# Patient Record
Sex: FEMALE | Race: WHITE | NOT HISPANIC OR LATINO | Employment: STUDENT
[De-identification: names, ages, dates, MRNs, and addresses within clinical notes are randomized per-mention and may not be internally consistent; named-entity substitution may affect disease eponyms.]

---

## 2019-05-17 ENCOUNTER — NEW PATIENT (OUTPATIENT)
Age: 24
End: 2019-05-17

## 2019-05-17 DIAGNOSIS — H52.203: ICD-10-CM

## 2019-05-17 DIAGNOSIS — H31.091: ICD-10-CM

## 2019-05-17 DIAGNOSIS — H44.23: ICD-10-CM

## 2019-05-17 PROCEDURE — 92004 COMPRE OPH EXAM NEW PT 1/>: CPT

## 2019-05-17 PROCEDURE — 92015 DETERMINE REFRACTIVE STATE: CPT

## 2019-05-17 ASSESSMENT — VISUAL ACUITY
OS_CC: 20/20-1
OD_CC: 20/20

## 2019-05-17 ASSESSMENT — TONOMETRY
OD_IOP_MMHG: 18
OS_IOP_MMHG: 16

## 2020-09-24 ENCOUNTER — RX CHANGE - NO APPT (OUTPATIENT)
Age: 25
End: 2020-09-24

## 2022-06-21 ENCOUNTER — NEW PATIENT (OUTPATIENT)
Age: 27
End: 2022-06-21

## 2022-06-21 ENCOUNTER — APPOINTMENT (RX ONLY)
Dept: URBAN - METROPOLITAN AREA CLINIC 151 | Facility: CLINIC | Age: 27
Setting detail: DERMATOLOGY
End: 2022-06-21

## 2022-06-21 DIAGNOSIS — D22 MELANOCYTIC NEVI: ICD-10-CM

## 2022-06-21 DIAGNOSIS — H52.203: ICD-10-CM

## 2022-06-21 DIAGNOSIS — H31.091: ICD-10-CM

## 2022-06-21 DIAGNOSIS — H52.13: ICD-10-CM

## 2022-06-21 PROBLEM — D22.4 MELANOCYTIC NEVI OF SCALP AND NECK: Status: ACTIVE | Noted: 2022-06-21

## 2022-06-21 PROCEDURE — 92004 COMPRE OPH EXAM NEW PT 1/>: CPT

## 2022-06-21 PROCEDURE — ? OBSERVATION AND MEASURE

## 2022-06-21 PROCEDURE — 99202 OFFICE O/P NEW SF 15 MIN: CPT

## 2022-06-21 PROCEDURE — ? COUNSELING

## 2022-06-21 PROCEDURE — ? DIAGNOSIS COMMENT

## 2022-06-21 PROCEDURE — 92015 DETERMINE REFRACTIVE STATE: CPT

## 2022-06-21 PROCEDURE — ? SUNSCREEN RECOMMENDATIONS

## 2022-06-21 ASSESSMENT — VISUAL ACUITY
OD_CC: 20/20-2
OS_CC: 20/25-1

## 2022-06-21 ASSESSMENT — KERATOMETRY
OD_AXISANGLE2_DEGREES: 88
OS_AXISANGLE_DEGREES: 4
OS_K1POWER_DIOPTERS: 41.5
OD_K1POWER_DIOPTERS: 40.75
OD_K2POWER_DIOPTERS: 42.25
OD_AXISANGLE_DEGREES: 178
OS_K2POWER_DIOPTERS: 42.5
OS_AXISANGLE2_DEGREES: 94

## 2022-06-21 ASSESSMENT — TONOMETRY
OS_IOP_MMHG: 12
OD_IOP_MMHG: 14

## 2022-06-21 ASSESSMENT — LOCATION SIMPLE DESCRIPTION DERM: LOCATION SIMPLE: RIGHT ANTERIOR NECK

## 2022-06-21 ASSESSMENT — LOCATION ZONE DERM: LOCATION ZONE: NECK

## 2022-06-21 ASSESSMENT — LOCATION DETAILED DESCRIPTION DERM: LOCATION DETAILED: RIGHT INFERIOR LATERAL NECK

## 2022-06-21 NOTE — PROCEDURE: OBSERVATION
Body Location Override (Optional - Billing Will Still Be Based On Selected Body Map Location If Applicable): right neck
Detail Level: Detailed
Size Of Lesion: 1.4cm x 1cm
Morphology Per Location (Optional): Medium brown plaque

## 2023-01-01 NOTE — PROCEDURE: DIAGNOSIS COMMENT
Comment: Discussed shave removal vs excision if removal is desired.
Detail Level: Simple
Render Risk Assessment In Note?: no
Statement Selected

## 2023-06-09 ENCOUNTER — APPOINTMENT (RX ONLY)
Dept: URBAN - METROPOLITAN AREA CLINIC 151 | Facility: CLINIC | Age: 28
Setting detail: DERMATOLOGY
End: 2023-06-09

## 2023-06-09 DIAGNOSIS — D22 MELANOCYTIC NEVI: ICD-10-CM

## 2023-06-09 PROBLEM — D22.5 MELANOCYTIC NEVI OF TRUNK: Status: ACTIVE | Noted: 2023-06-09

## 2023-06-09 PROBLEM — D22.4 MELANOCYTIC NEVI OF SCALP AND NECK: Status: ACTIVE | Noted: 2023-06-09

## 2023-06-09 PROCEDURE — ? DIAGNOSIS COMMENT

## 2023-06-09 PROCEDURE — ? SUNSCREEN RECOMMENDATIONS

## 2023-06-09 PROCEDURE — ? OBSERVATION AND MEASURE

## 2023-06-09 PROCEDURE — 99213 OFFICE O/P EST LOW 20 MIN: CPT

## 2023-06-09 PROCEDURE — ? COUNSELING

## 2023-06-09 PROCEDURE — ? PHOTO-DOCUMENTATION

## 2023-06-09 ASSESSMENT — LOCATION DETAILED DESCRIPTION DERM
LOCATION DETAILED: LEFT LATERAL SUPERIOR CHEST
LOCATION DETAILED: RIGHT INFERIOR LATERAL NECK

## 2023-06-09 ASSESSMENT — LOCATION ZONE DERM
LOCATION ZONE: TRUNK
LOCATION ZONE: NECK

## 2023-06-09 ASSESSMENT — LOCATION SIMPLE DESCRIPTION DERM
LOCATION SIMPLE: CHEST
LOCATION SIMPLE: RIGHT ANTERIOR NECK

## 2023-06-09 NOTE — HPI: OTHER
Condition:: Spot check
Please Describe Your Condition:: \\n\\nMole on L chest is a bit bothersome\\n- ?getting bigger\\n- rarely ever irritated \\n\\n

## 2023-06-09 NOTE — PROCEDURE: OBSERVATION
Body Location Override (Optional - Billing Will Still Be Based On Selected Body Map Location If Applicable): right lateral neck
Detail Level: Detailed
Size Of Lesion: 1.4cm x 1cm
Morphology Per Location (Optional): Medium brown plaque
Size Of Lesion: 6-7mm
Morphology Per Location (Optional): even brown, perfectly symmetric, round macule

## 2024-08-30 ENCOUNTER — APPOINTMENT (RX ONLY)
Dept: URBAN - METROPOLITAN AREA CLINIC 151 | Facility: CLINIC | Age: 29
Setting detail: DERMATOLOGY
End: 2024-08-30

## 2024-08-30 DIAGNOSIS — D22 MELANOCYTIC NEVI: ICD-10-CM

## 2024-08-30 PROBLEM — D23.72 OTHER BENIGN NEOPLASM OF SKIN OF LEFT LOWER LIMB, INCLUDING HIP: Status: ACTIVE | Noted: 2024-08-30

## 2024-08-30 PROBLEM — D22.5 MELANOCYTIC NEVI OF TRUNK: Status: ACTIVE | Noted: 2024-08-30

## 2024-08-30 PROBLEM — D22.4 MELANOCYTIC NEVI OF SCALP AND NECK: Status: ACTIVE | Noted: 2024-08-30

## 2024-08-30 PROCEDURE — ? DIAGNOSIS COMMENT

## 2024-08-30 PROCEDURE — ? SUNSCREEN RECOMMENDATIONS

## 2024-08-30 PROCEDURE — ? COUNSELING

## 2024-08-30 PROCEDURE — 99213 OFFICE O/P EST LOW 20 MIN: CPT

## 2024-08-30 PROCEDURE — ? OBSERVATION AND MEASURE

## 2024-08-30 ASSESSMENT — LOCATION DETAILED DESCRIPTION DERM
LOCATION DETAILED: LEFT LATERAL SUPERIOR CHEST
LOCATION DETAILED: RIGHT INFERIOR LATERAL NECK

## 2024-08-30 ASSESSMENT — LOCATION ZONE DERM
LOCATION ZONE: TRUNK
LOCATION ZONE: NECK

## 2024-08-30 ASSESSMENT — LOCATION SIMPLE DESCRIPTION DERM
LOCATION SIMPLE: RIGHT ANTERIOR NECK
LOCATION SIMPLE: CHEST

## 2024-08-30 NOTE — PROCEDURE: OBSERVATION
Body Location Override (Optional - Billing Will Still Be Based On Selected Body Map Location If Applicable): right lateral neck
Detail Level: Detailed
Size Of Lesion: 1.4cm x 1cm
Morphology Per Location (Optional): Medium brown plaque
Size Of Lesion: 10 by 9mm
Morphology Per Location (Optional): even brown, perfectly symmetric, round patch with light pink at the top edge

## 2024-08-30 NOTE — PROCEDURE: DIAGNOSIS COMMENT
Comment: Stable
Render Risk Assessment In Note?: no
Detail Level: Zone
Comment: Photos reviewed. Recommended patient to come back if it reoccurs.